# Patient Record
Sex: FEMALE | Race: WHITE
[De-identification: names, ages, dates, MRNs, and addresses within clinical notes are randomized per-mention and may not be internally consistent; named-entity substitution may affect disease eponyms.]

---

## 2017-09-14 ENCOUNTER — HOSPITAL ENCOUNTER (INPATIENT)
Dept: HOSPITAL 53 - M LDO | Age: 32
LOS: 2 days | Discharge: HOME | DRG: 560 | End: 2017-09-16
Attending: ADVANCED PRACTICE MIDWIFE | Admitting: ADVANCED PRACTICE MIDWIFE
Payer: MEDICAID

## 2017-09-14 VITALS — SYSTOLIC BLOOD PRESSURE: 120 MMHG | DIASTOLIC BLOOD PRESSURE: 66 MMHG

## 2017-09-14 VITALS — SYSTOLIC BLOOD PRESSURE: 188 MMHG | DIASTOLIC BLOOD PRESSURE: 123 MMHG

## 2017-09-14 VITALS — SYSTOLIC BLOOD PRESSURE: 118 MMHG | DIASTOLIC BLOOD PRESSURE: 56 MMHG

## 2017-09-14 VITALS — DIASTOLIC BLOOD PRESSURE: 59 MMHG | SYSTOLIC BLOOD PRESSURE: 111 MMHG

## 2017-09-14 VITALS — DIASTOLIC BLOOD PRESSURE: 72 MMHG | SYSTOLIC BLOOD PRESSURE: 134 MMHG

## 2017-09-14 VITALS — BODY MASS INDEX: 24.19 KG/M2 | WEIGHT: 178.57 LBS | HEIGHT: 72 IN

## 2017-09-14 VITALS — DIASTOLIC BLOOD PRESSURE: 56 MMHG | SYSTOLIC BLOOD PRESSURE: 116 MMHG

## 2017-09-14 VITALS — SYSTOLIC BLOOD PRESSURE: 118 MMHG | DIASTOLIC BLOOD PRESSURE: 62 MMHG

## 2017-09-14 VITALS — SYSTOLIC BLOOD PRESSURE: 174 MMHG | DIASTOLIC BLOOD PRESSURE: 111 MMHG

## 2017-09-14 VITALS — DIASTOLIC BLOOD PRESSURE: 71 MMHG | SYSTOLIC BLOOD PRESSURE: 113 MMHG

## 2017-09-14 VITALS — SYSTOLIC BLOOD PRESSURE: 118 MMHG | DIASTOLIC BLOOD PRESSURE: 68 MMHG

## 2017-09-14 VITALS — SYSTOLIC BLOOD PRESSURE: 107 MMHG | DIASTOLIC BLOOD PRESSURE: 53 MMHG

## 2017-09-14 VITALS — DIASTOLIC BLOOD PRESSURE: 76 MMHG | SYSTOLIC BLOOD PRESSURE: 123 MMHG

## 2017-09-14 VITALS — DIASTOLIC BLOOD PRESSURE: 73 MMHG | SYSTOLIC BLOOD PRESSURE: 144 MMHG

## 2017-09-14 VITALS — SYSTOLIC BLOOD PRESSURE: 127 MMHG | DIASTOLIC BLOOD PRESSURE: 65 MMHG

## 2017-09-14 VITALS — DIASTOLIC BLOOD PRESSURE: 66 MMHG | SYSTOLIC BLOOD PRESSURE: 115 MMHG

## 2017-09-14 VITALS — DIASTOLIC BLOOD PRESSURE: 60 MMHG | SYSTOLIC BLOOD PRESSURE: 120 MMHG

## 2017-09-14 VITALS — DIASTOLIC BLOOD PRESSURE: 83 MMHG | SYSTOLIC BLOOD PRESSURE: 116 MMHG

## 2017-09-14 DIAGNOSIS — Z91.5: ICD-10-CM

## 2017-09-14 DIAGNOSIS — F43.10: ICD-10-CM

## 2017-09-14 DIAGNOSIS — F32.9: ICD-10-CM

## 2017-09-14 DIAGNOSIS — Z81.8: ICD-10-CM

## 2017-09-14 DIAGNOSIS — F17.210: ICD-10-CM

## 2017-09-14 DIAGNOSIS — Z3A.39: ICD-10-CM

## 2017-09-14 LAB
ERYTHROCYTE [DISTWIDTH] IN BLOOD BY AUTOMATED COUNT: 13.2 % (ref 11.5–14.5)
HBSAG L&D: NEGATIVE
MCH RBC QN AUTO: 30.8 PG (ref 27–33)
MCHC RBC AUTO-ENTMCNC: 32.9 G/DL (ref 32–36.5)
MCV RBC AUTO: 93.6 FL (ref 80–96)
PLATELET # BLD AUTO: 247 K/MM3 (ref 150–450)
WBC # BLD AUTO: 14 K/MM3 (ref 4–10)

## 2017-09-14 PROCEDURE — 10907ZC DRAINAGE OF AMNIOTIC FLUID, THERAPEUTIC FROM PRODUCTS OF CONCEPTION, VIA NATURAL OR ARTIFICIAL OPENING: ICD-10-PCS | Performed by: ADVANCED PRACTICE MIDWIFE

## 2017-09-14 RX ADMIN — IBUPROFEN PRN MG: 800 TABLET, FILM COATED ORAL at 22:12

## 2017-09-14 NOTE — DNPDOC
[facility] Delivery Note


Delivery Note


DATE OF DELIVERY: 2017





PREDELIVERY DIAGNOSIS: Spontaneous active labor at term. No prenatal care, 

approximately 39 weeks' gestation-


POST DELIVERY DIAGNOSIS: Delivered.





PROCEDURE: Spontaneous vaginal delivery.





Midwife Courtney Sevilla and Kim Larson student nurse midwife.





ANESTHESIA: Epidural.





ESTIMATED BLOOD LOSS: 350 mL.





FINDINGS: 6 pound 14 ounce, 3110 g female infant, Apgar Score 8/9, 





DELIVERY SUMMARY: Patient is a 32-year-old  11 para 3 -0 -8-3 who was 

admitted to labor and delivery for active labor. Patient was comfortable with 

epidural. Artificial rupture of membranes for clear fluid. Large amount at 

1934. Fully dilated at the same time. Viable female child delivered RICHARD at 

1946. Spontaneous respirations with stimulation transitioned on the maternal 

abdomen. Cord was doubly clamped and cut after pulsations ceased. Placenta 

Giron intact with three-vessel cord and trailing membranes at 1956. Fundus 

firmed with massage and IV Pitocin bolus. Estimated blood loss 350 mL. Perineum 

intact. Sponge, sharp and instrument count correct.











Courtney Sevilla CNM Sep 14, 2017 21:13
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
## 2017-09-14 NOTE — IPNPDOC
Date Seen


The patient was seen on 17.





Progress Note


SUBJECTIVE: Patient is a 32-year-old female with  11 para  in active 

labor





OBJECTIVE


PHYSICAL EXAMINATION:


VITAL SIGNS: Please see below. 


GENERAL: Coping well breathing with contractions


ABDOMINAL: Contractions remained irregular palpate firm 2-5 minutes apart, 

bedside ultrasound confirms vertex presentation, fetal heart 120 moderate 

variability category 1 tracing. Patient has had no prenatal care this pregnancy.


PSYCHOLOGICAL: Father of the baby and family present and supportive.





LABORATORY DATA: Please see below.





MICROBIOLOGY: Please see below.





IMAGING: Bedside ultrasound confirms vertex presentation





ASSESSMENT AND PLAN: This is a 32-year-old female,  11, para  with 

active labor, vertex presentation. Admit, routine labs including hepatitis and 

HIV. Patient desires IV pain medicine. Dr. Tejada aware of patient's status. 

Anticipate normal spontaneous vaginal birth.





VS, I&O, 24H, Fishbone


Vital Signs/I&O





Vital Signs








  Date Time  Temp Pulse Resp B/P (MAP) Pulse Ox O2 Delivery O2 Flow Rate FiO2


 


17 14:05   24     











Laboratory Data


24H LABS


Laboratory Tests 2


17 12:55: 


17 12:56: 


Urine Appearance CLEAR, Urine Color STRAW, Urine pH 7.0, Urine Specific Gravity 

1.003, Urine Protein NEGATIVE, Urine Glucose (UA) NEGATIVE, Urine Ketones 

NEGATIVE, Urine Urobilinogen 0.2, Urine Bilirubin NEGATIVE, Urine Leukocyte 

Esterase 2+H, Urine Blood 2+H, Urine Nitrite NEGATIVE, Urine WBC (Auto) 9H, 

Urine RBC (Auto) 2, Urine Hyaline Casts (Auto) 0, Urine Bacteria (Auto) 1+H, 

Urine Squamous Epithelial Cells 1, Urine Sperm (Auto) 


Remainder of labs pending


CBC/BMP


Laboratory Tests


17 12:56








Red Blood Count 4.21, Mean Corpuscular Volume 93.6, Mean Corpuscular Hemoglobin 

30.8, Mean Corpuscular Hemoglobin Concent 32.9, Red Cell Distribution Width 13.2











Courtney Sevilla CNM Sep 14, 2017 14:37

## 2017-09-15 VITALS — SYSTOLIC BLOOD PRESSURE: 119 MMHG | DIASTOLIC BLOOD PRESSURE: 57 MMHG

## 2017-09-15 VITALS — DIASTOLIC BLOOD PRESSURE: 55 MMHG | SYSTOLIC BLOOD PRESSURE: 104 MMHG

## 2017-09-15 VITALS — DIASTOLIC BLOOD PRESSURE: 57 MMHG | SYSTOLIC BLOOD PRESSURE: 101 MMHG

## 2017-09-15 RX ADMIN — SERTRALINE HYDROCHLORIDE SCH MG: 25 TABLET ORAL at 09:00

## 2017-09-15 RX ADMIN — IBUPROFEN PRN MG: 800 TABLET, FILM COATED ORAL at 19:17

## 2017-09-15 RX ADMIN — Medication SCH TAB: at 09:00

## 2017-09-15 RX ADMIN — IBUPROFEN PRN MG: 800 TABLET, FILM COATED ORAL at 10:15
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
## 2017-09-16 RX ADMIN — SERTRALINE HYDROCHLORIDE SCH MG: 25 TABLET ORAL at 09:21

## 2017-09-16 RX ADMIN — Medication SCH TAB: at 08:21

## 2019-10-11 ENCOUNTER — HOSPITAL ENCOUNTER (INPATIENT)
Dept: HOSPITAL 53 - M LDO | Age: 34
LOS: 2 days | Discharge: HOME | DRG: 560 | End: 2019-10-13
Attending: ADVANCED PRACTICE MIDWIFE | Admitting: ADVANCED PRACTICE MIDWIFE
Payer: SELF-PAY

## 2019-10-11 VITALS — SYSTOLIC BLOOD PRESSURE: 106 MMHG | DIASTOLIC BLOOD PRESSURE: 59 MMHG

## 2019-10-11 VITALS — HEIGHT: 72 IN | WEIGHT: 159.39 LBS | BODY MASS INDEX: 21.59 KG/M2

## 2019-10-11 VITALS — DIASTOLIC BLOOD PRESSURE: 51 MMHG | SYSTOLIC BLOOD PRESSURE: 107 MMHG

## 2019-10-11 VITALS — SYSTOLIC BLOOD PRESSURE: 107 MMHG | DIASTOLIC BLOOD PRESSURE: 56 MMHG

## 2019-10-11 VITALS — SYSTOLIC BLOOD PRESSURE: 111 MMHG | DIASTOLIC BLOOD PRESSURE: 53 MMHG

## 2019-10-11 DIAGNOSIS — F17.200: ICD-10-CM

## 2019-10-11 DIAGNOSIS — Z3A.41: ICD-10-CM

## 2019-10-11 DIAGNOSIS — O48.0: Primary | ICD-10-CM

## 2019-10-11 DIAGNOSIS — F41.9: ICD-10-CM

## 2019-10-11 LAB
BASE EXCESS BLDCOA CALC-SCNC: -1.6 MMOL/L
BASE EXCESS BLDCOV CALC-SCNC: -0.4 MMOL/L
BASOPHILS # BLD AUTO: 0 10^3/UL (ref 0–0.2)
BASOPHILS NFR BLD AUTO: 0.2 % (ref 0–1)
CO2 BLDCOA CALC-SCNC: 28.5 MEQ/L
CO2 BLDCOV CALC-SCNC: 27 MEQ/L
EOSINOPHIL # BLD AUTO: 0.2 10^3/UL (ref 0–0.5)
EOSINOPHIL NFR BLD AUTO: 1.1 % (ref 0–3)
HCO3 STD BLDCOA-SCNC: 22.4 MEQ/L
HCO3 STD BLDCOA-SCNC: 26.7 MEQ/L
HCO3 STD BLDCOV-SCNC: 23.9 MEQ/L
HCO3 STD BLDCOV-SCNC: 25.6 MEQ/L
HCT VFR BLD AUTO: 32.7 % (ref 36–47)
HGB BLD-MCNC: 11 G/DL (ref 12–15.5)
HIV 1+2 AB+HIV1 P24 AG SERPL QL IA: NEGATIVE
LYMPHOCYTES # BLD AUTO: 3.4 10^3/UL (ref 1.5–5)
LYMPHOCYTES NFR BLD AUTO: 21.4 % (ref 24–44)
MCH RBC QN AUTO: 30.2 PG (ref 27–33)
MCHC RBC AUTO-ENTMCNC: 33.6 G/DL (ref 32–36.5)
MCV RBC AUTO: 89.8 FL (ref 80–96)
MONOCYTES # BLD AUTO: 0.9 10^3/UL (ref 0–0.8)
MONOCYTES NFR BLD AUTO: 5.3 % (ref 0–5)
NEUTROPHILS # BLD AUTO: 11.5 10^3/UL (ref 1.5–8.5)
NEUTROPHILS NFR BLD AUTO: 71.4 % (ref 36–66)
PCO2 BLDCOA: 58.7 MMHG
PCO2 BLDCOV: 46.8 MMHG
PH BLDCOA: 7.28 UNITS
PH BLDCOV: 7.36 UNITS
PLATELET # BLD AUTO: 231 10^3/UL (ref 150–450)
PO2 BLDCOA: 27.6 MMHG
PO2 BLDCOV: 41.3 MMHG
RBC # BLD AUTO: 3.64 10^6/UL (ref 4–5.4)
RUBELLA IGG QUALITATIVE: (no result)
SAO2 % BLDCOA: 71.7 %
SAO2 % BLDCOV: 89.8 %
WBC # BLD AUTO: 16.1 10^3/UL (ref 4–10)

## 2019-10-11 PROCEDURE — 10907ZC DRAINAGE OF AMNIOTIC FLUID, THERAPEUTIC FROM PRODUCTS OF CONCEPTION, VIA NATURAL OR ARTIFICIAL OPENING: ICD-10-PCS | Performed by: ADVANCED PRACTICE MIDWIFE

## 2019-10-11 RX ADMIN — METHYLERGONOVINE MALEATE SCH MG: 0.2 TABLET ORAL at 23:03

## 2019-10-11 RX ADMIN — FERROUS SULFATE TAB 325 MG (65 MG ELEMENTAL FE) SCH MG: 325 (65 FE) TAB at 22:15
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
## 2019-10-11 NOTE — DNPDOC
[facility] Delivery Note


Delivery Note


DATE OF DELIVERY: 10/11/2019





PREDELIVERY DIAGNOSIS: Approximately 41 weeks' gestation and labor. No prenatal 

care 





POST DELIVERY DIAGNOSIS: Delivered.





PROCEDURE:. Spontaneous vaginal delivery.





Provider: Courtney Sevilla CNM





ANESTHESIA: None.





ESTIMATED BLOOD LOSS: 1000 mL.





FINDINGS: 7 pound 2 ounce, 3230 g male infant, Apgar Score, 8/, 9, no nuchal 

cord.





DELIVERY SUMMARY: Patient is a 34-year-old  12 now para 4 -0 -8-4 who was

admitted to labor and delivery for active labor, imminent delivery. Upon admit. 

Patient was found to be fully dilated, +1 station with a category 1 tracing. 

Artificial rupture of membranes . Viable male delivered LUKE at . 

Spontaneous respirations. Transitioned on maternal abdomen. Cord gases were 

obtained and are pending. Cord doubly clamped and cut once pulsation ceased. 

Apgars 8 and 9, brisk bleeding continued during third stage of delivery. 

Placenta Judd intact . Fundus firmed with massage and IV Pitocin bolus, 

brisk bleeding persisted 1000 g misoprostol provided MA with excellent control 

of bleeding. Estimated blood loss approximately 1000 mL. Cervix, vagina and 

perineum intact. Sponge, sharp and instrument count correct. Parents are naming 

their sonElias.











Courtney Sevilla CNM  Oct 11, 2019 22:53

## 2019-10-12 VITALS — DIASTOLIC BLOOD PRESSURE: 47 MMHG | SYSTOLIC BLOOD PRESSURE: 91 MMHG

## 2019-10-12 VITALS — DIASTOLIC BLOOD PRESSURE: 56 MMHG | SYSTOLIC BLOOD PRESSURE: 104 MMHG

## 2019-10-12 LAB
AMPHETAMINES UR QL SCN: NEGATIVE
BARBITURATES UR QL SCN: NEGATIVE
BENZODIAZ UR QL SCN: NEGATIVE
BZE UR QL SCN: NEGATIVE
CANNABINOIDS UR QL SCN: NEGATIVE
CHLAMYDIA DNA AMPLIFICATION: NEGATIVE
HCT VFR BLD AUTO: 30.2 % (ref 36–47)
HGB BLD-MCNC: 9.9 G/DL (ref 12–15.5)
MCH RBC QN AUTO: 29.6 PG (ref 27–33)
MCHC RBC AUTO-ENTMCNC: 32.8 G/DL (ref 32–36.5)
MCV RBC AUTO: 90.4 FL (ref 80–96)
METHADONE UR QL SCN: NEGATIVE
N GONORRHOEA RRNA SPEC QL NAA+PROBE: NEGATIVE
OPIATES UR QL SCN: NEGATIVE
PCP UR QL SCN: NEGATIVE
PLATELET # BLD AUTO: 233 10^3/UL (ref 150–450)
RBC # BLD AUTO: 3.34 10^6/UL (ref 4–5.4)
WBC # BLD AUTO: 12.3 10^3/UL (ref 4–10)

## 2019-10-12 RX ADMIN — SERTRALINE HYDROCHLORIDE SCH MG: 25 TABLET ORAL at 10:14

## 2019-10-12 RX ADMIN — FERROUS SULFATE TAB 325 MG (65 MG ELEMENTAL FE) SCH MG: 325 (65 FE) TAB at 10:15

## 2019-10-12 RX ADMIN — FERROUS SULFATE TAB 325 MG (65 MG ELEMENTAL FE) SCH MG: 325 (65 FE) TAB at 21:06

## 2019-10-12 RX ADMIN — METHYLERGONOVINE MALEATE SCH MG: 0.2 TABLET ORAL at 15:45

## 2019-10-12 RX ADMIN — Medication SCH TAB: at 10:15

## 2019-10-12 RX ADMIN — METHYLERGONOVINE MALEATE SCH MG: 0.2 TABLET ORAL at 04:44

## 2019-10-12 RX ADMIN — METHYLERGONOVINE MALEATE SCH MG: 0.2 TABLET ORAL at 10:17

## 2019-10-12 NOTE — IPNPDOC
Text Note


Date of Service


The patient was seen on 10/12/19.





NOTE


Feels well. Adequate pain management. Bottle feeding. Voiding





VSS, afebrile normotensive.


Fundus firm, NT, down 2


Lochia rubra light without odor


Perineum intact





MDCD BTL consent signed per pt request.





Routine care. Consider discharge tomorrow or Monday





VS,Fishbone, I+O


VS, Fishbone, I+O


Laboratory Tests


10/11/19 20:22








Red Blood Count 3.64 L, Mean Corpuscular Volume 89.8, Mean Corpuscular 

Hemoglobin 30.2, Mean Corpuscular Hemoglobin Concent 33.6, Red Cell Distribution

Width 14.7 H, Neutrophils (%) (Auto) 71.4 H, Lymphocytes (%) (Auto) 21.4 L, 

Monocytes (%) (Auto) 5.3 H, Eosinophils (%) (Auto) 1.1, Basophils (%) (Auto) 

0.2, Neutrophils # (Auto) 11.5 H, Lymphocytes # (Auto) 3.4, Monocytes # (Auto) 

0.9 H, Eosinophils # (Auto) 0.2, Basophils # (Auto) 0.0





10/12/19 06:26








Red Blood Count 3.34 L, Mean Corpuscular Volume 90.4, Mean Corpuscular 

Hemoglobin 29.6, Mean Corpuscular Hemoglobin Concent 32.8, Red Cell Distribution

Width 14.6 H








Vital Signs








  Date Time  Temp Pulse Resp B/P (MAP) Pulse Ox O2 Delivery O2 Flow Rate FiO2


 


10/12/19 05:51 97.4 65 16 91/47 (62)    














I&O- Last 24 Hours up to 6 AM 


 


 10/12/19





 05:59


 


Output Total 1000 ml


 


Balance -1000 ml

















Courtney Sevilla CNM  Oct 12, 2019 07:48

## 2019-10-13 VITALS — DIASTOLIC BLOOD PRESSURE: 58 MMHG | SYSTOLIC BLOOD PRESSURE: 107 MMHG

## 2019-10-13 RX ADMIN — SERTRALINE HYDROCHLORIDE SCH MG: 25 TABLET ORAL at 08:44

## 2019-10-13 RX ADMIN — Medication SCH TAB: at 08:44

## 2019-10-13 RX ADMIN — FERROUS SULFATE TAB 325 MG (65 MG ELEMENTAL FE) SCH MG: 325 (65 FE) TAB at 08:44

## 2019-10-14 LAB — HCV AB SER QL: 0.2 INDEX (ref ?–0.8)

## 2023-07-06 ENCOUNTER — HOSPITAL ENCOUNTER (EMERGENCY)
Dept: HOSPITAL 53 - M ED | Age: 38
Discharge: LEFT BEFORE DISCHARGE | End: 2023-07-06
Payer: COMMERCIAL

## 2023-07-06 VITALS — TEMPERATURE: 98 F | SYSTOLIC BLOOD PRESSURE: 125 MMHG | DIASTOLIC BLOOD PRESSURE: 58 MMHG | OXYGEN SATURATION: 97 %

## 2023-07-06 VITALS — WEIGHT: 159.39 LBS | BODY MASS INDEX: 21.59 KG/M2 | HEIGHT: 72 IN

## 2023-07-06 DIAGNOSIS — Z53.21: Primary | ICD-10-CM
